# Patient Record
Sex: FEMALE | Race: WHITE | NOT HISPANIC OR LATINO | Employment: OTHER | URBAN - METROPOLITAN AREA
[De-identification: names, ages, dates, MRNs, and addresses within clinical notes are randomized per-mention and may not be internally consistent; named-entity substitution may affect disease eponyms.]

---

## 2022-03-08 PROBLEM — I50.9 CHF (CONGESTIVE HEART FAILURE): Status: ACTIVE | Noted: 2022-03-08

## 2022-03-09 NOTE — PROGRESS NOTES
Date of Office Visit: 03/10/2022  Encounter Provider: Dr. Santino Paz  Place of Service: [facility] CARDIOLOGY [place]  Patient Name: Latrice Zarate  :1929  Brittany Heard APRN    Chief Complaint   Patient presents with   • Congestive Heart Failure   • Consult     History of Present Illness:    I am pleased to see Mrs. Zarate in my office today as a new consultation.    As you know, patient is 89 years old white female whose past medical history is significant for chronic atrial fibrillation, hypertension, who is referred to me for symptom of bilateral leg edema.    Patient was resident of [place], [place].  In summer 2021, she was admitted at local hospital at [facility] and was diagnosed with persistent atrial fibrillation.  Patient underwent cardiac work-up including stress test and echocardiogram and as per patient they were normal.  Patient was started on Xarelto and Ziac and was discharged.  Patient moved to this area as her son lives in this area.  She is in assisted living.    In 2022, patient developed bilateral leg edema.  She started having bullous edema.  She was seen by [facility] and was started on furosemide 40 mg daily.  Patient was referred to me for further recommendation and evaluation.    Today patient does not seem to be in congestive heart failure.  Her leg edema has improved.  She denies any significant shortness of breath.  No JVD are noted.  There is no crackles or rhonchi.  Patient denies any orthopnea or PND no syncope or presyncope.  She denies any palpitation.    EKG showed atrial fibrillation with heart rate of 78 bpm.    Patient developed bilateral leg edema etiology is unclear.  She was definitely volume overload which has improved with diuretic therapy.  I think she is in euvolemic status and her dry weight at present.  I advised the son to decrease Lasix to 20 mg daily.  Daily weights and leg edema should be monitored.  If needed patient can increase  Lasix in future.  Patient had extensive cardiac work-up at [facility].  I will try to get the records from there.    Patient has persistent chronic atrial fibrillation.  I would continue Xarelto and rate control strategy.  She would not be a candidate for cardioversion or any invasive arrhythmia therapy.  She is very weak and frail.        Past Medical History:   Diagnosis Date   • Atrial fibrillation (HCC)    • CHF (congestive heart failure) (HCC)          Past Surgical History:   Procedure Laterality Date   • ADENOIDECTOMY     • CHOLECYSTECTOMY             Current Outpatient Medications:   •  ALPRAZolam (XANAX) 0.25 MG tablet, 1 TABLET BY MOUTH TWICE A DAY (BUBBLE), Disp: , Rfl:   •  bisoprolol-hydrochlorothiazide (ZIAC) 10-6.25 MG per tablet, Take 1 tablet by mouth Daily., Disp: , Rfl:   •  citalopram (CeleXA) 20 MG tablet, , Disp: , Rfl:   •   MG capsule, , Disp: , Rfl:   •  ferrous sulfate 325 (65 Fe) MG tablet, , Disp: , Rfl:   •  furosemide (LASIX) 40 MG tablet, , Disp: , Rfl:   •  levothyroxine (SYNTHROID, LEVOTHROID) 150 MCG tablet, TAKE 1 TABLET BY MOUTH EVERY DAY ( BUBBLE ) NOT CYCLE PLEASE REORDER, Disp: , Rfl:   •  omeprazole (prilOSEC) 10 MG capsule, , Disp: , Rfl:   •  temazepam (RESTORIL) 15 MG capsule, TAKE 1 CAPSULE BY MOUTH EVERY EVENING ( BUBBLE ), Disp: , Rfl:   •  traMADol (ULTRAM) 50 MG tablet, Take 50 mg by mouth 2 (Two) Times a Day As Needed., Disp: , Rfl:   •  Xarelto 10 MG tablet, , Disp: , Rfl:       Social History     Socioeconomic History   • Marital status:    Tobacco Use   • Smoking status: Never Smoker   • Smokeless tobacco: Never Used   Vaping Use   • Vaping Use: Never used   Substance and Sexual Activity   • Alcohol use: Not Currently   • Drug use: Never   • Sexual activity: Defer         Review of Systems   Constitutional: Negative for chills and fever.   HENT: Negative for ear discharge and nosebleeds.    Eyes: Negative for discharge and  "redness.   Cardiovascular: Positive for leg swelling. Negative for chest pain, orthopnea, palpitations, paroxysmal nocturnal dyspnea and syncope.   Respiratory: Positive for shortness of breath. Negative for cough and wheezing.    Endocrine: Negative for heat intolerance.   Skin: Negative for rash.   Musculoskeletal: Positive for arthritis, back pain and joint pain. Negative for myalgias.   Gastrointestinal: Negative for abdominal pain, melena, nausea and vomiting.   Genitourinary: Negative for dysuria and hematuria.   Neurological: Negative for dizziness, light-headedness, numbness and tremors.   Psychiatric/Behavioral: Negative for depression. The patient is not nervous/anxious.        Procedures      ECG 12 Lead    Date/Time: 3/10/2022 4:56 PM  Performed by: Santino Paz MD  Authorized by: Santino Paz MD   Previous ECG: no previous ECG available  Rhythm: atrial fibrillation    Clinical impression: abnormal EKG            ECG 12 Lead    (Results Pending)           Objective:    /72   Pulse 78   Ht 160 cm (63\")   Wt 49.4 kg (109 lb)   BMI 19.31 kg/m²         Constitutional:       Appearance: Well-developed.   Eyes:      General: No scleral icterus.        Right eye: No discharge.   HENT:      Head: Normocephalic and atraumatic.   Neck:      Thyroid: No thyromegaly.      Lymphadenopathy: No cervical adenopathy.   Pulmonary:      Effort: Pulmonary effort is normal. No respiratory distress.      Breath sounds: Normal breath sounds. No wheezing. No rales.   Cardiovascular:      Normal rate. Regular rhythm.      No gallop.   Abdominal:      Tenderness: There is no abdominal tenderness.   Skin:     Findings: No erythema or rash.   Neurological:      Mental Status: Alert and oriented to person, place, and time.             Assessment:       Diagnosis Plan   1. Congestive heart failure, unspecified HF chronicity, unspecified heart failure type (HCC)  ECG 12 Lead   2. Bilateral leg edema  ECG 12 Lead   3. " Atrial fibrillation, persistent (HCC)  ECG 12 Lead            Plan:       MDM:    1.  Bilateral leg edema:    Leg edema has improved.  Decrease Lasix to 20 mg daily.  Monitor daily weights    2.  Congestive heart failure acute on chronic diastolic:    I do not have report of her echocardiogram but it was reported to be normal as per patient in the [facility] hospital in [place] I will try to get the record    3.  Chronic persistent atrial fibrillation:    Patient heart rate is controlled patient is on Xarelto.  She would not be a good candidate for cardioversion

## 2022-03-10 ENCOUNTER — OFFICE VISIT (OUTPATIENT)
Dept: CARDIOLOGY | Facility: CLINIC | Age: 87
End: 2022-03-10

## 2022-03-10 VITALS
HEART RATE: 78 BPM | SYSTOLIC BLOOD PRESSURE: 118 MMHG | HEIGHT: 63 IN | WEIGHT: 109 LBS | DIASTOLIC BLOOD PRESSURE: 72 MMHG | BODY MASS INDEX: 19.31 KG/M2

## 2022-03-10 DIAGNOSIS — I48.19 ATRIAL FIBRILLATION, PERSISTENT: ICD-10-CM

## 2022-03-10 DIAGNOSIS — R60.0 BILATERAL LEG EDEMA: ICD-10-CM

## 2022-03-10 DIAGNOSIS — I50.9 CONGESTIVE HEART FAILURE, UNSPECIFIED HF CHRONICITY, UNSPECIFIED HEART FAILURE TYPE: Primary | ICD-10-CM

## 2022-03-10 PROCEDURE — 93000 ELECTROCARDIOGRAM COMPLETE: CPT | Performed by: INTERNAL MEDICINE

## 2022-03-10 PROCEDURE — 99204 OFFICE O/P NEW MOD 45 MIN: CPT | Performed by: INTERNAL MEDICINE

## 2022-03-10 RX ORDER — TRAMADOL HYDROCHLORIDE 50 MG/1
50 TABLET ORAL 2 TIMES DAILY PRN
COMMUNITY
Start: 2022-02-17

## 2022-03-10 RX ORDER — LEVOTHYROXINE SODIUM 0.15 MG/1
TABLET ORAL
COMMUNITY
Start: 2022-02-10

## 2022-03-10 RX ORDER — RIVAROXABAN 10 MG/1
TABLET, FILM COATED ORAL
COMMUNITY
Start: 2022-02-14

## 2022-03-10 RX ORDER — CITALOPRAM 20 MG/1
TABLET ORAL
COMMUNITY
Start: 2022-02-14

## 2022-03-10 RX ORDER — OMEPRAZOLE 10 MG/1
CAPSULE, DELAYED RELEASE ORAL
COMMUNITY
Start: 2022-02-14

## 2022-03-10 RX ORDER — ALPRAZOLAM 0.25 MG/1
TABLET ORAL
COMMUNITY
Start: 2022-02-18

## 2022-03-10 RX ORDER — TEMAZEPAM 15 MG/1
CAPSULE ORAL
COMMUNITY
Start: 2022-03-08

## 2022-03-10 RX ORDER — FUROSEMIDE 40 MG/1
TABLET ORAL
COMMUNITY
Start: 2022-02-15

## 2022-03-10 RX ORDER — PNV NO.95/FERROUS FUM/FOLIC AC 28MG-0.8MG
TABLET ORAL
COMMUNITY
Start: 2022-02-18

## 2022-03-10 RX ORDER — DOCUSATE SODIUM 100 MG/1
CAPSULE, LIQUID FILLED ORAL
COMMUNITY
Start: 2022-02-18

## 2022-03-10 RX ORDER — BISOPROLOL FUMARATE AND HYDROCHLOROTHIAZIDE 10; 6.25 MG/1; MG/1
1 TABLET ORAL DAILY
COMMUNITY